# Patient Record
Sex: MALE | Race: BLACK OR AFRICAN AMERICAN | Employment: OTHER | URBAN - METROPOLITAN AREA
[De-identification: names, ages, dates, MRNs, and addresses within clinical notes are randomized per-mention and may not be internally consistent; named-entity substitution may affect disease eponyms.]

---

## 2018-04-26 PROBLEM — R33.9 URINARY RETENTION: Status: ACTIVE | Noted: 2018-04-26

## 2018-04-26 PROBLEM — R33.8 BENIGN PROSTATIC HYPERPLASIA WITH URINARY RETENTION: Status: ACTIVE | Noted: 2018-04-26

## 2018-04-26 PROBLEM — N40.1 BENIGN PROSTATIC HYPERPLASIA WITH URINARY RETENTION: Status: ACTIVE | Noted: 2018-04-26

## 2018-08-28 PROBLEM — N31.2 ATONIC BLADDER: Status: ACTIVE | Noted: 2018-04-13

## 2018-10-07 PROBLEM — R39.9 LOWER URINARY TRACT SYMPTOMS (LUTS): Status: ACTIVE | Noted: 2018-10-07

## 2019-09-28 PROBLEM — R11.10 INTRACTABLE VOMITING: Status: ACTIVE | Noted: 2019-09-28

## 2019-09-28 PROBLEM — N39.0 UTI (URINARY TRACT INFECTION): Status: ACTIVE | Noted: 2019-09-28

## 2019-09-30 PROBLEM — R13.10 DYSPHAGIA: Status: ACTIVE | Noted: 2019-09-30

## 2020-01-09 ENCOUNTER — HOSPITAL ENCOUNTER (OUTPATIENT)
Dept: NUTRITION | Age: 80
Discharge: HOME | End: 2020-01-09
Payer: MEDICARE

## 2020-01-09 PROCEDURE — 97802 MEDICAL NUTRITION INDIV IN: CPT

## 2020-01-09 NOTE — PROGRESS NOTES
[address] [place] [address], [address] [place] [address] Road  Phone: [phone] Fax: [phone]   Nutrition Assessment - Medical Nutrition Therapy   Outpatient Initial Evaluation         Patient Name: Marcelino Osullivan : 1940   Treatment Diagnosis: Esophageal dysphagia, Type 2 diabetes    Referral Source: Penny Euceda MD Start of Care [facility]): 2020     Gender: male Age: 78 y.o. Ht: 67\" in Wt: 165.4  lb  kg   BMI: 25.9  (Overweight) BMR   Male 1423 BMR Female      Past Medical History:  Stroke, type 2 diabetes      Pertinent Medications:   Glimepiride, lantus solostar (15u bedtime), metformin, novolog (sliding scale)   Biochemical Data:   Lab Results   Component Value Date/Time    Hemoglobin A1c 9.2 (H) 2019 03:30 PM     Lab Results   Component Value Date/Time    Sodium 140 10/01/2019 05:44 AM    Potassium 3.6 10/01/2019 05:44 AM    Chloride 110 (H) 10/01/2019 05:44 AM    CO2 23 10/01/2019 05:44 AM    Anion gap 7 10/01/2019 05:44 AM    Glucose 83 10/01/2019 05:44 AM    BUN 7 10/01/2019 05:44 AM    Creatinine 0.9 10/01/2019 05:44 AM    GFR est AA >60 10/01/2019 05:44 AM    GFR est non-AA >60 10/01/2019 05:44 AM    Calcium 8.7 10/01/2019 05:44 AM    Bilirubin, total 0.3 2019 06:25 AM    AST (SGOT) 21 2019 06:25 AM    Alk. phosphatase 72 2019 06:25 AM    Protein, total 8.0 2019 06:25 AM    Albumin 3.2 (L) 2019 06:25 AM    ALT (SGPT) 32 2019 06:25 AM     Lab Results   Component Value Date/Time    Cholesterol, total 163 10/01/2019 05:44 AM    HDL Cholesterol 88 10/01/2019 05:44 AM    LDL, calculated 63 10/01/2019 05:44 AM    Triglyceride 59 10/01/2019 05:44 AM    CHOL/HDL Ratio 1.9 10/01/2019 05:44 AM     Lab Results   Component Value Date/Time    ALT (SGPT) 32 2019 06:25 AM    AST (SGOT) 21 2019 06:25 AM    Alk.  phosphatase 72 2019 06:25 AM    Bilirubin, total 0.3 2019 06:25 AM     Lab Results Component Value Date/Time    Creatinine 0.9 10/01/2019 05:44 AM     Lab Results   Component Value Date/Time    BUN 7 10/01/2019 05:44 AM     No results found for: MCACR, MCA1, MCA2, MCA3, MCAU, MCAU2, MCALPOCT     Assessment:   Pt in for initial nutrition visit with adult daughter. Pt is transitioning to po intake. His BG is increasing with increased PO intake. About a week ago he started taking food orally. Been on PEG since October 2019. Pt has been diabetic since 1989, type 2 diabetes. Pt denies having had much diabetes education since 1989. 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 Was checking BG 2x/day before the stroke. Now, checks BG 4x/day currently. Pt states his Doctor states he wants his BG between  mg/dL. Pt does not recall his A1c previously before the stroke. Pt and daughter wanting to know how to transition off TF. Mechanically soft diet and honey-thickened liquid. Pt seems to be following the guidelines for thickening but daughter states he's been eating things he's not supposed to like a bite of Milkyway, etc. Pt also is eating foods that are not low in sugar/carbohydrate. Pt does demonstrate some non-compliance with his dysphagia diet. Food & Nutrition: *Pt was eating too much and his daughter had to tell him to stop eating. 10 am: Breakfast:   oatmeal 1/2 cup, water, + 1 packet equal + banana + honey-thick 8 oz. Milk (2%), 1 egg    12:00 yogurt, sometimes another chocolate instant pudding (regular)    Snacking: chicken, eggs    Pt currently on continuous feeding, Glucerna, 5 cans/day, 55 mL/hour. Glucerna = 1.5 braulio/mL (356 kcal/ 8 oz. Can). However, there is some discrepancy because pt states he's only taking in 5 cans, but he also states he is on continuous feeds 23.75 hours/day and doesn't unhook it but 15-20 min/day. If the latter is true, he's taking 5.5 cans/day.  His TF intake is between 2088-8229 kcal (plus oral intake). TF currently provides about 98g -108 protein. Carbs = 155g - 170gcarb                                                        Nutrition Intervention &  Education: Educated pt about how to read a nutrition label and how to reduce tube feeding amounts based on how much he eats. Practiced reading the nutrition label on the Glucerna can and on food products to assess how to wean cans. Encourage pt to start by weaning off 1 can at a time and monitor changes by checking BG and weight as well. Educated pt on mechanical soft diet, provided handout. Educated pt on how to treat a low blood sugar with honey-thickened liquid status. Provided handout. Eduated pt on the importance of following his dysphagia diet recommendations made by ST. Handouts Provided: []  Carbohydrates  []  Protein  []  Non-starchy Vegatbles  []  Food Label  []  Meal and Snack Ideas  []  Food Journals []  Diabetes  []  Cholesterol  []  Sodium  []  Gen Nutr Guidelines  []  SBGM Guidelines  [x]  Others:   Information Reviewed with: Pt and daughter   Readiness to Change Stage: []  Pre-contemplative    [x]  Contemplative  []  Preparation               []  Action                  []  Maintenance   Potential Barriers to Learning: []  Decline in memory    []  Language barrier   []  Other:  []  Emotional                  []  Limited mobility  []  Lack of motivation     [] Vision, hearing or cognitive impairment         Nutritional Goal - To promote lifestyle changes to result in:    []  Weight loss  [x]  Improved diabetic control  []  Decreased cholesterol levels  []  Decreased blood pressure  [x]  Weight maintenance []  Preventing any interactions associated with food allergies  []  Adequate weight gain toward goal weight  []  Other:        Patient Goals:   1. Reduce cans as you add food equivalent (numbers provided)  2. Monitor blood sugars and weight  3.  Stick with your dysphagia diet     Dietitian Signature: Lynn Jackson, ROBERTA,RD,CHWC Date: 1/9/2020   Follow-up: Pt declined follow-up, stated will schedule as needed.  Time: 11:08 AM

## 2021-11-29 PROBLEM — A41.9 SEPSIS (HCC): Status: ACTIVE | Noted: 2021-11-29

## 2021-11-29 PROBLEM — N28.89 URETERITIS: Status: ACTIVE | Noted: 2021-11-29

## 2021-12-23 PROBLEM — K85.90 ACUTE PANCREATITIS: Status: ACTIVE | Noted: 2021-12-23

## 2021-12-29 PROBLEM — H26.493 OTHER SECONDARY CATARACT, BILATERAL: Status: ACTIVE | Noted: 2021-12-29

## 2021-12-29 PROBLEM — H40.1131 PRIMARY OPEN-ANGLE GLAUCOMA, BILATERAL, MILD STAGE: Status: ACTIVE | Noted: 2021-12-29

## 2021-12-29 PROBLEM — K21.9 GASTROESOPHAGEAL REFLUX DISEASE WITHOUT ESOPHAGITIS: Status: ACTIVE | Noted: 2020-11-11

## 2021-12-29 PROBLEM — H43.811 POSTERIOR VITREOUS DETACHMENT, RIGHT EYE: Status: ACTIVE | Noted: 2021-12-29

## 2021-12-29 PROBLEM — H04.123 DRY EYE SYNDROME OF BILATERAL LACRIMAL GLANDS: Status: ACTIVE | Noted: 2021-12-29

## 2022-07-27 PROBLEM — K14.8 MASS OF TONGUE: Status: ACTIVE | Noted: 2022-07-27

## 2022-10-29 PROBLEM — U07.1 COVID-19: Status: ACTIVE | Noted: 2022-10-29
